# Patient Record
Sex: MALE | Race: WHITE | NOT HISPANIC OR LATINO | Employment: FULL TIME | URBAN - METROPOLITAN AREA
[De-identification: names, ages, dates, MRNs, and addresses within clinical notes are randomized per-mention and may not be internally consistent; named-entity substitution may affect disease eponyms.]

---
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
## 2018-11-19 ENCOUNTER — OFFICE VISIT - HEALTHEAST (OUTPATIENT)
Dept: CARDIOLOGY | Facility: CLINIC | Age: 18
End: 2018-11-19

## 2018-11-19 DIAGNOSIS — R00.2 PALPITATIONS: ICD-10-CM

## 2018-11-19 ASSESSMENT — MIFFLIN-ST. JEOR: SCORE: 1765.45

## 2018-11-21 LAB
ATRIAL RATE - MUSE: 66 BPM
DIASTOLIC BLOOD PRESSURE - MUSE: NORMAL MMHG
INTERPRETATION ECG - MUSE: NORMAL
P AXIS - MUSE: 38 DEGREES
PR INTERVAL - MUSE: 114 MS
QRS DURATION - MUSE: 94 MS
QT - MUSE: 368 MS
QTC - MUSE: 385 MS
R AXIS - MUSE: 54 DEGREES
SYSTOLIC BLOOD PRESSURE - MUSE: NORMAL MMHG
T AXIS - MUSE: 67 DEGREES
VENTRICULAR RATE- MUSE: 66 BPM

## 2019-01-10 ENCOUNTER — HOSPITAL ENCOUNTER (OUTPATIENT)
Dept: CARDIOLOGY | Facility: HOSPITAL | Age: 19
Discharge: HOME | End: 2019-01-10
Attending: INTERNAL MEDICINE

## 2019-01-10 ENCOUNTER — HOSPITAL ENCOUNTER (OUTPATIENT)
Dept: CARDIOLOGY | Facility: CLINIC | Age: 19
Discharge: HOME | End: 2019-01-10
Attending: INTERNAL MEDICINE

## 2019-01-10 DIAGNOSIS — R00.2 PALPITATIONS: ICD-10-CM

## 2019-01-10 ASSESSMENT — MIFFLIN-ST. JEOR: SCORE: 1761.49

## 2019-01-11 LAB
AORTIC ROOT: 2.7 CM
BSA FOR ECHO PROCEDURE: 1.92 M2
CV BLOOD PRESSURE: NORMAL MMHG
CV ECHO HEIGHT: 71 IN
CV ECHO WEIGHT: 163 LBS
DOP CALC LVOT AREA: 3.14 CM2
DOP CALC LVOT DIAMETER: 2 CM
DOP CALC LVOT PEAK VEL: 80.5 CM/S
DOP CALC LVOT STROKE VOLUME: 49.3 CM3
DOP CALCLVOT PEAK VEL VTI: 15.7 CM
EJECTION FRACTION: 72 % (ref 55–75)
FRACTIONAL SHORTENING: 35 % (ref 28–44)
INTERVENTRICULAR SEPTUM IN END DIASTOLE: 0.91 CM (ref 0.6–1)
IVS/PW RATIO: 1.2
LA AREA 1: 10.3 CM2
LA AREA 2: 16.5 CM2
LEFT ATRIUM LENGTH: 4.28 CM
LEFT ATRIUM SIZE: 2.5 CM
LEFT ATRIUM TO AORTIC ROOT RATIO: 0.93 NO UNITS
LEFT ATRIUM VOLUME INDEX: 17.6 ML/M2
LEFT ATRIUM VOLUME: 33.8 ML
LEFT VENTRICLE CARDIAC INDEX: 1.7 L/MIN/M2
LEFT VENTRICLE CARDIAC OUTPUT: 3.3 L/MIN
LEFT VENTRICLE DIASTOLIC VOLUME INDEX: 62 CM3/M2 (ref 34–74)
LEFT VENTRICLE DIASTOLIC VOLUME: 119 CM3 (ref 62–150)
LEFT VENTRICLE HEART RATE: 67 BPM
LEFT VENTRICLE MASS INDEX: 69.7 G/M2
LEFT VENTRICLE SYSTOLIC VOLUME INDEX: 17.2 CM3/M2 (ref 11–31)
LEFT VENTRICLE SYSTOLIC VOLUME: 33 CM3 (ref 21–61)
LEFT VENTRICULAR INTERNAL DIMENSION IN DIASTOLE: 4.77 CM (ref 4.2–5.8)
LEFT VENTRICULAR INTERNAL DIMENSION IN SYSTOLE: 3.1 CM (ref 2.5–4)
LEFT VENTRICULAR MASS: 133.8 G
LEFT VENTRICULAR OUTFLOW TRACT MEAN GRADIENT: 2 MMHG
LEFT VENTRICULAR OUTFLOW TRACT MEAN VELOCITY: 56.7 CM/S
LEFT VENTRICULAR OUTFLOW TRACT PEAK GRADIENT: 3 MMHG
LEFT VENTRICULAR POSTERIOR WALL IN END DIASTOLE: 0.77 CM (ref 0.6–1)
LV STROKE VOLUME INDEX: 25.7 ML/M2
MITRAL VALVE E/A RATIO: 2.8
MV AVERAGE E/E' RATIO: 5.6 CM/S
MV DECELERATION TIME: 183 MS
MV E'TISSUE VEL-LAT: 20.1 CM/S
MV E'TISSUE VEL-MED: 13.5 CM/S
MV LATERAL E/E' RATIO: 4.7
MV MEDIAL E/E' RATIO: 6.9
MV PEAK A VELOCITY: 34.1 CM/S
MV PEAK E VELOCITY: 93.8 CM/S
NUC REST DIASTOLIC VOLUME INDEX: 2608 LBS
NUC REST SYSTOLIC VOLUME INDEX: 71 IN
TRICUSPID VALVE ANULAR PLANE SYSTOLIC EXCURSION: 2.3 CM

## 2019-08-09 ENCOUNTER — OFFICE VISIT - HEALTHEAST (OUTPATIENT)
Dept: FAMILY MEDICINE | Facility: CLINIC | Age: 19
End: 2019-08-09

## 2019-08-09 ENCOUNTER — COMMUNICATION - HEALTHEAST (OUTPATIENT)
Dept: TELEHEALTH | Facility: CLINIC | Age: 19
End: 2019-08-09

## 2019-08-09 DIAGNOSIS — F41.9 ANXIETY: ICD-10-CM

## 2019-08-09 ASSESSMENT — MIFFLIN-ST. JEOR: SCORE: 1771.12

## 2019-09-13 ENCOUNTER — OFFICE VISIT - HEALTHEAST (OUTPATIENT)
Dept: FAMILY MEDICINE | Facility: CLINIC | Age: 19
End: 2019-09-13

## 2019-09-13 DIAGNOSIS — Z23 NEED FOR VACCINATION: ICD-10-CM

## 2019-09-13 DIAGNOSIS — F41.9 ANXIETY: ICD-10-CM

## 2019-09-13 ASSESSMENT — ANXIETY QUESTIONNAIRES
6. BECOMING EASILY ANNOYED OR IRRITABLE: SEVERAL DAYS
IF YOU CHECKED OFF ANY PROBLEMS ON THIS QUESTIONNAIRE, HOW DIFFICULT HAVE THESE PROBLEMS MADE IT FOR YOU TO DO YOUR WORK, TAKE CARE OF THINGS AT HOME, OR GET ALONG WITH OTHER PEOPLE: SOMEWHAT DIFFICULT
7. FEELING AFRAID AS IF SOMETHING AWFUL MIGHT HAPPEN: SEVERAL DAYS
GAD7 TOTAL SCORE: 5
1. FEELING NERVOUS, ANXIOUS, OR ON EDGE: SEVERAL DAYS
2. NOT BEING ABLE TO STOP OR CONTROL WORRYING: NOT AT ALL
5. BEING SO RESTLESS THAT IT IS HARD TO SIT STILL: NOT AT ALL
3. WORRYING TOO MUCH ABOUT DIFFERENT THINGS: SEVERAL DAYS
4. TROUBLE RELAXING: SEVERAL DAYS

## 2019-09-13 ASSESSMENT — MIFFLIN-ST. JEOR: SCORE: 1766.7

## 2019-09-13 ASSESSMENT — PATIENT HEALTH QUESTIONNAIRE - PHQ9: SUM OF ALL RESPONSES TO PHQ QUESTIONS 1-9: 2

## 2019-12-31 ENCOUNTER — COMMUNICATION - HEALTHEAST (OUTPATIENT)
Dept: FAMILY MEDICINE | Facility: CLINIC | Age: 19
End: 2019-12-31

## 2020-01-07 ENCOUNTER — OFFICE VISIT - HEALTHEAST (OUTPATIENT)
Dept: FAMILY MEDICINE | Facility: CLINIC | Age: 20
End: 2020-01-07

## 2020-01-07 DIAGNOSIS — F40.243 ANXIETY WITH FLYING: ICD-10-CM

## 2020-01-07 DIAGNOSIS — F41.9 ANXIETY: ICD-10-CM

## 2020-01-07 ASSESSMENT — ANXIETY QUESTIONNAIRES
IF YOU CHECKED OFF ANY PROBLEMS ON THIS QUESTIONNAIRE, HOW DIFFICULT HAVE THESE PROBLEMS MADE IT FOR YOU TO DO YOUR WORK, TAKE CARE OF THINGS AT HOME, OR GET ALONG WITH OTHER PEOPLE: NOT DIFFICULT AT ALL
3. WORRYING TOO MUCH ABOUT DIFFERENT THINGS: SEVERAL DAYS
7. FEELING AFRAID AS IF SOMETHING AWFUL MIGHT HAPPEN: SEVERAL DAYS
2. NOT BEING ABLE TO STOP OR CONTROL WORRYING: NOT AT ALL
1. FEELING NERVOUS, ANXIOUS, OR ON EDGE: SEVERAL DAYS
6. BECOMING EASILY ANNOYED OR IRRITABLE: NOT AT ALL
4. TROUBLE RELAXING: SEVERAL DAYS
GAD7 TOTAL SCORE: 4
5. BEING SO RESTLESS THAT IT IS HARD TO SIT STILL: NOT AT ALL

## 2020-01-07 ASSESSMENT — MIFFLIN-ST. JEOR: SCORE: 1833.83

## 2020-01-07 ASSESSMENT — PATIENT HEALTH QUESTIONNAIRE - PHQ9: SUM OF ALL RESPONSES TO PHQ QUESTIONS 1-9: 3

## 2020-02-18 ENCOUNTER — COMMUNICATION - HEALTHEAST (OUTPATIENT)
Dept: SCHEDULING | Facility: CLINIC | Age: 20
End: 2020-02-18

## 2020-04-20 ENCOUNTER — OFFICE VISIT - HEALTHEAST (OUTPATIENT)
Dept: FAMILY MEDICINE | Facility: CLINIC | Age: 20
End: 2020-04-20

## 2020-04-20 DIAGNOSIS — F41.9 ANXIETY: ICD-10-CM

## 2020-04-20 ASSESSMENT — ANXIETY QUESTIONNAIRES
7. FEELING AFRAID AS IF SOMETHING AWFUL MIGHT HAPPEN: NOT AT ALL
IF YOU CHECKED OFF ANY PROBLEMS ON THIS QUESTIONNAIRE, HOW DIFFICULT HAVE THESE PROBLEMS MADE IT FOR YOU TO DO YOUR WORK, TAKE CARE OF THINGS AT HOME, OR GET ALONG WITH OTHER PEOPLE: NOT DIFFICULT AT ALL
5. BEING SO RESTLESS THAT IT IS HARD TO SIT STILL: NOT AT ALL
3. WORRYING TOO MUCH ABOUT DIFFERENT THINGS: SEVERAL DAYS
GAD7 TOTAL SCORE: 3
2. NOT BEING ABLE TO STOP OR CONTROL WORRYING: NOT AT ALL
6. BECOMING EASILY ANNOYED OR IRRITABLE: NOT AT ALL
4. TROUBLE RELAXING: SEVERAL DAYS
1. FEELING NERVOUS, ANXIOUS, OR ON EDGE: SEVERAL DAYS

## 2020-04-20 ASSESSMENT — PATIENT HEALTH QUESTIONNAIRE - PHQ9: SUM OF ALL RESPONSES TO PHQ QUESTIONS 1-9: 1

## 2020-07-16 ENCOUNTER — COMMUNICATION - HEALTHEAST (OUTPATIENT)
Dept: FAMILY MEDICINE | Facility: CLINIC | Age: 20
End: 2020-07-16

## 2020-07-16 DIAGNOSIS — F41.9 ANXIETY: ICD-10-CM

## 2020-07-22 ENCOUNTER — COMMUNICATION - HEALTHEAST (OUTPATIENT)
Dept: BEHAVIORAL HEALTH | Facility: CLINIC | Age: 20
End: 2020-07-22

## 2021-01-11 ENCOUNTER — OFFICE VISIT - HEALTHEAST (OUTPATIENT)
Dept: FAMILY MEDICINE | Facility: CLINIC | Age: 21
End: 2021-01-11

## 2021-01-11 DIAGNOSIS — J45.20 MILD INTERMITTENT REACTIVE AIRWAY DISEASE WITHOUT COMPLICATION: ICD-10-CM

## 2021-01-11 RX ORDER — ALBUTEROL SULFATE 90 UG/1
2 AEROSOL, METERED RESPIRATORY (INHALATION) EVERY 6 HOURS PRN
Qty: 1 EACH | Refills: 0 | Status: SHIPPED | OUTPATIENT
Start: 2021-01-11

## 2021-01-11 RX ORDER — ESCITALOPRAM OXALATE 10 MG/1
TABLET ORAL
Status: SHIPPED | COMMUNITY
Start: 2020-11-03

## 2021-01-11 RX ORDER — BUPROPION HYDROCHLORIDE 150 MG/1
TABLET ORAL
Status: SHIPPED | COMMUNITY
Start: 2020-11-03 | End: 2022-12-20 | Stop reason: ALTCHOICE

## 2021-02-15 ENCOUNTER — OFFICE VISIT - HEALTHEAST (OUTPATIENT)
Dept: FAMILY MEDICINE | Facility: CLINIC | Age: 21
End: 2021-02-15

## 2021-02-15 DIAGNOSIS — J45.20 MILD INTERMITTENT REACTIVE AIRWAY DISEASE WITHOUT COMPLICATION: ICD-10-CM

## 2021-02-15 DIAGNOSIS — F41.9 ANXIETY: ICD-10-CM

## 2021-05-26 ASSESSMENT — PATIENT HEALTH QUESTIONNAIRE - PHQ9: SUM OF ALL RESPONSES TO PHQ QUESTIONS 1-9: 2

## 2021-05-27 ASSESSMENT — PATIENT HEALTH QUESTIONNAIRE - PHQ9
SUM OF ALL RESPONSES TO PHQ QUESTIONS 1-9: 1
SUM OF ALL RESPONSES TO PHQ QUESTIONS 1-9: 3

## 2021-05-28 ASSESSMENT — ANXIETY QUESTIONNAIRES
GAD7 TOTAL SCORE: 4
GAD7 TOTAL SCORE: 5
GAD7 TOTAL SCORE: 3

## 2021-05-28 ASSESSMENT — ASTHMA QUESTIONNAIRES: ACT_TOTALSCORE: 22

## 2021-05-30 VITALS — WEIGHT: 149 LBS | BODY MASS INDEX: 20.86 KG/M2 | HEIGHT: 71 IN

## 2021-05-31 VITALS — WEIGHT: 159.5 LBS | HEIGHT: 71 IN | BODY MASS INDEX: 22.33 KG/M2

## 2021-05-31 NOTE — PROGRESS NOTES
"Assessment / Impression     1. Anxiety  sertraline (ZOLOFT) 50 MG tablet         Plan:     Discussed with patient various treatment options, including medications as well as therapy.  Patient would like to start medication, and will consider meeting with counselor when he returns to school.  We will begin sertraline 25 mg daily for 1 week, then increase to 50mg daily.  Discussed possible side effects of medication such as GI side effects, decreased libido, possible weight gain.   If patient notes any severe side effects such as severe agitation or suicidal ideation, he may call the clinic or 911 immediately for further instructions. Discussed with patient that it may take 4-6 weeks for full effect.  We may also need to increase dose.  Patient to make follow-up appointment with me in 4-6 weeks and says he can return home for an appointment if needed.      Return in about 1 month (around 9/9/2019) for Follow Up, Med Check.    Subjective:      HPI: Jalen Hurtado is a 18 y.o. male, new to [facility], who presents for anxiety concerns.  Feels he has had anxiety \"for a lot of my life\".  He previously had had meds for concerns of flying with Ativan.  He goes to school at [employer], and anxiety has been worse due to pressure with school.  He is studying management information systems.  He has difficulty concentrating when he knows there's a test coming up, and feels difficulty concentrating is due to the anxiety.  He made [employer] first semester, though didn't do as well, thinking it's due to anxiety.  No one else has necessarily noted anxiety. Planning to go back to school August 23.  No suicidal or homicidal ideation.  His brother and sister do also have anxiety.      Medical History:     Patient Active Problem List   Diagnosis     Vaccination Not Carried Out Due To Caregiver Refusal     Visual Impairment     Wears glasses       History reviewed. No pertinent past medical history.    History reviewed. No " "pertinent surgical history.    Current Medications:     Current Outpatient Medications   Medication Sig     sertraline (ZOLOFT) 50 MG tablet Take 1/2 tab daily for 1 week, then 1 tab daily.       Family History:     Family History   Problem Relation Age of Onset     No Medical Problems Mother      No Medical Problems Father      Anxiety disorder Sister      Anxiety disorder Brother      Neuropathy Maternal Grandfather      Other Paternal Grandmother         Colon issues     Lung cancer Paternal Grandfather         was a smoker       Review of Systems  All other systems reviewed and are negative.         Social History:     Social History     Tobacco Use   Smoking Status Never Smoker   Smokeless Tobacco Current User     Types: Snuff     Social History     Social History Narrative     Not on file         Objective:     /66 (Patient Site: Right Arm, Patient Position: Sitting, Cuff Size: Adult Regular)   Pulse 60   Temp 98  F (36.7  C) (Oral)   Resp 16   Ht 5' 11.5\" (1.816 m)   Wt 163 lb 6 oz (74.1 kg)   BMI 22.47 kg/m    Physical Examination: General appearance - alert, well appearing, and in no distress  Eyes: pupils equal and reactive, extraocular eye movements intact  Mouth: mucous membranes moist, pharynx normal without lesions  Lungs: normal respiratory effort  Neurological: alert, oriented, normal speech, no focal findings or movement disorder noted.    Extremities: No edema, no clubbing or cyanosis  Psychiatric: Normal affect. Does not appear anxious or depressed.  Normal speech pattern.  Maintains eye contact.  Well-groomed.  PHQ-9 = 3.  KAREN-7 = 8.  See flow sheet for details.    No results found for this or any previous visit (from the past 168 hour(s)).      Mary Alberts MD  8/9/2019  10:48 AM        "

## 2021-06-01 NOTE — PROGRESS NOTES
Assessment / Impression     1. Anxiety  sertraline (ZOLOFT) 50 MG tablet   2. Need for vaccination  Influenza,Seasonal,Quad,INJ =/>6months    CANCELED: HPV vaccine quadrivalent 3 dose IM         Plan:     Overall, patient has been doing well on current dose of sertraline 50 mg daily.  We did briefly discussed the patient would like to increase, though he feels symptoms are manageable at this time, therefore we will keep at sertraline 50 mg daily.  If he does feel he needs an increase in dose, he may let me know or come in for office visit.  Plan to check in at least once every 6 to 12 months, sooner if needed.        Return in about 6 months (around 3/13/2020) for Med Check, Annual physical.    Subjective:      HPI: Jalen Hurtado is a 18 y.o. male who presents for anxiety follow-up.  Please see my previous note for more details.  Briefly, at last visit with me, we had started patient on sertraline, currently taking 50 mg daily.  He has since returned to college at [employer], and does note an improvement.  Overall has noted more energy, and and has less over thinking in various circumstances.  He did initially note some mild decreased appetite when he started medication, though that has gone.  He still has some mild anxiety, though not sure if it would ever fully go away and feels it is manageable at this time.  No suicidal homicidal ideation.      Medical History:     Patient Active Problem List   Diagnosis     Vaccination Not Carried Out Due To Caregiver Refusal     Visual Impairment     Wears glasses       History reviewed. No pertinent past medical history.    History reviewed. No pertinent surgical history.    Current Medications:     Current Outpatient Medications   Medication Sig     sertraline (ZOLOFT) 50 MG tablet Take 1 tablet (50 mg total) by mouth daily.       Family History:     Family History   Problem Relation Age of Onset     No Medical Problems Mother      No Medical Problems Father      Anxiety  "disorder Sister      Anxiety disorder Brother      Neuropathy Maternal Grandfather      Other Paternal Grandmother         Colon issues     Lung cancer Paternal Grandfather         was a smoker       Review of Systems  All other systems reviewed and are negative.         Social History:     Social History     Tobacco Use   Smoking Status Never Smoker   Smokeless Tobacco Current User     Types: Snuff     Social History     Social History Narrative     Not on file         Objective:     /68 (Patient Site: Left Arm, Patient Position: Sitting, Cuff Size: Adult Regular)   Pulse 80   Temp 98.3  F (36.8  C) (Oral)   Resp 18   Ht 5' 11.5\" (1.816 m)   Wt 162 lb 6.4 oz (73.7 kg)   BMI 22.33 kg/m    Physical Examination: General appearance - alert, well appearing, and in no distress  Eyes: pupils equal and reactive, extraocular eye movements intact  Neurological: alert, oriented, normal speech, no focal findings or movement disorder noted.    Extremities: No edema, no clubbing or cyanosis  Psychiatric: Normal affect. Does not appear anxious or depressed.  Normal speech pattern.  Maintains eye contact.  Well-groomed.  No active suicidal homicidal ideation.  PHQ-9  2.  KAREN-70+5.  See flow sheet for details.    No results found for this or any previous visit (from the past 168 hour(s)).      Mary Alberts MD  9/13/2019  4:17 PM        "

## 2021-06-02 VITALS — HEIGHT: 71 IN | BODY MASS INDEX: 22.82 KG/M2 | WEIGHT: 163 LBS

## 2021-06-02 VITALS — WEIGHT: 163 LBS | BODY MASS INDEX: 22.82 KG/M2 | HEIGHT: 71 IN

## 2021-06-03 VITALS
DIASTOLIC BLOOD PRESSURE: 62 MMHG | WEIGHT: 177.2 LBS | HEART RATE: 62 BPM | RESPIRATION RATE: 16 BRPM | BODY MASS INDEX: 24 KG/M2 | HEIGHT: 72 IN | SYSTOLIC BLOOD PRESSURE: 108 MMHG

## 2021-06-03 VITALS
BODY MASS INDEX: 22 KG/M2 | HEIGHT: 72 IN | WEIGHT: 162.4 LBS | DIASTOLIC BLOOD PRESSURE: 68 MMHG | RESPIRATION RATE: 18 BRPM | HEART RATE: 80 BPM | SYSTOLIC BLOOD PRESSURE: 104 MMHG | TEMPERATURE: 98.3 F

## 2021-06-03 VITALS — HEIGHT: 72 IN | BODY MASS INDEX: 22.13 KG/M2 | WEIGHT: 163.38 LBS

## 2021-06-04 NOTE — TELEPHONE ENCOUNTER
Pt needs appt with me before rx can be given.  Ok to use reserve slot. Please assist patient in scheduling appointment.

## 2021-06-05 NOTE — PROGRESS NOTES
Assessment / Impression     1. Anxiety  sertraline (ZOLOFT) 50 MG tablet   2. Anxiety with flying  LORazepam (ATIVAN) 1 MG tablet         Plan:     Patient feels the symptoms have improved, though does feel that there is room for improvement.  I did offer to increase the dosing of his sertraline or switch to another medication, though patient was somewhat hesitant also because he will be abroad and unable to follow-up with me for a few months.  Therefore, I do agree it is reasonable for him to continue sertraline 50 mg daily at this time.  I did give him a one-time vacation override so he will have enough medication during his 4 months abroad.  He was also given a small amount of Ativan 1 mg #10 tabs with no refills which he may take prior to flying.  Advised patient that he should not take medication with alcohol use.  Patient understands, agrees with plan.  He will follow-up with me when he returns from [place].    Return in about 4 months (around 5/11/2020) for Med Check.    Subjective:      HPI: Jalen Hurtado is a 19 y.o. male who presents for anxiety follow-up.  Please see my previous notes for more details.  He has been on sertraline 50 mg daily, which he states has sore sort of helped with his anxiety, though he feels there could be room for improvement.  He also has noted decrease libido and other sexual side effects from medication, though he feels this is tolerable.  He will be leaving this Saturday to study abroad in [place] for 4 months.  He does not want to adjust medications at this time since he will be going out of the country for an extended period of time.    Patient also has anxiety surrounding flying, has previously used Ativan in the past to help with fear of flying, wondering if he can get a small amount.  He will also be traveling periodically in [place] during his time abroad.      Medical History:     Patient Active Problem List   Diagnosis     Vaccination Not Carried Out Due To Caregiver  "Refusal     Visual Impairment     Wears glasses       No past medical history on file.    No past surgical history on file.    Current Medications:     Current Outpatient Medications   Medication Sig     sertraline (ZOLOFT) 50 MG tablet Take 1 tablet (50 mg total) by mouth daily.     LORazepam (ATIVAN) 1 MG tablet Take 1/2 tab to 1 tab as needed one hour prior to air travel for anxiety.       Family History:     Family History   Problem Relation Age of Onset     No Medical Problems Mother      No Medical Problems Father      Anxiety disorder Sister      Anxiety disorder Brother      Neuropathy Maternal Grandfather      Other Paternal Grandmother         Colon issues     Lung cancer Paternal Grandfather         was a smoker       Review of Systems  All other systems reviewed and are negative.         Social History:     Social History     Tobacco Use   Smoking Status Never Smoker   Smokeless Tobacco Current User     Types: Snuff     Social History     Social History Narrative     Not on file         Objective:     /62 (Patient Site: Right Arm, Patient Position: Sitting, Cuff Size: Adult Regular)   Pulse 62   Resp 16   Ht 5' 11.5\" (1.816 m)   Wt 177 lb 3.2 oz (80.4 kg)   BMI 24.37 kg/m    Physical Examination: General appearance - alert, well appearing, and in no distress  Eyes: extraocular eye movements intact  Neurological: alert, oriented, normal speech, no focal findings or movement disorder noted.    Extremities: No edema, no clubbing or cyanosis  Psychiatric: Normal affect. Does not appear anxious or depressed.  Normal speech pattern.  Maintains eye contact.  PHQ-9 = 3.  KAREN-7 = 4.  See flow sheet for details.  No active suicidal or homicidal ideation.    No results found for this or any previous visit (from the past 168 hour(s)).      Mary Alberts MD  1/7/2020  12:32 PM         "

## 2021-06-06 NOTE — TELEPHONE ENCOUNTER
Mom jer informed to tell or help pt do an evisit I did give her our [facility] patient support number incase they arnt able to sign in or figure out how to submit the evisit

## 2021-06-06 NOTE — TELEPHONE ENCOUNTER
Son is studying in [place] until May. He is taking medication, seen by Dr Alberts just before he left. He would like trying to wean off of    RX: Sertraline 50mg tablet daily. He needs instructions how to do this.  He is using an international SIM card while he is in [place], but needs to insert it when he is using it.  He wants to know if he needs to set up an appointment to talk to Dr Alberts on the phone, or do an e-visit?     Please call mother with this information.     Zhanna Garcia RN Triage Nurse Advisor        Reason for Disposition    Caller has NON-URGENT medication question about med that PCP prescribed and triager unable to answer question    Protocols used: MEDICATION QUESTION CALL-A-

## 2021-06-07 NOTE — PROGRESS NOTES
"Jalen Hurtado is a 19 y.o. male who is being evaluated via a billable telephone visit.      The patient has been notified of following:     \"This telephone visit will be conducted via a call between you and your physician/provider. We have found that certain health care needs can be provided without the need for a physical exam.  This service lets us provide the care you need with a short phone conversation.  If a prescription is necessary we can send it directly to your pharmacy.  If lab work is needed we can place an order for that and you can then stop by our lab to have the test done at a later time.    Telephone visits are billed at different rates depending on your insurance coverage. During this emergency period, for some insurers they may be billed the same as an in-person visit.  Please reach out to your insurance provider with any questions.    If during the course of the call the physician/provider feels a telephone visit is not appropriate, you will not be charged for this service.\"    Patient has given verbal consent to a Telephone visit? Yes    Patient would like to receive their AVS by AVS Preference: Anayeli.    Additional provider notes:  19 year old male, here today for phone visit to discuss anxiety medication.  Was studying abroad in [place] this spring, however needed to return home early March due to COVID19.  He is otherwise healthy. Patient has been on sertraline for a few months, which has helped from an anxiety standpoint, however, he has noted side effects, especially sweats and sexual side effects including decreased libido.  He has not used any of his Ativan aside from flying.  Feels that his anxiety is manageable at this time and would prefer not to switch medication at this time.    See flowsheet for PHQ-9 and KAREN-7.    Assessment/Plan:  1. Anxiety  Managed at this time, though may be due to medications.  Offered option to switch to another medication, though he declined at this time.  " Will decrease sertraline to 25mg daily for 1 week then stop.  Did ask patient to check in with me in ~1 month to see how his symptoms are and consider starting different medication at that time.  Patient understands, agrees with plan.          Phone call duration:  Start time: 9:54am  Stop time: 10:00am    Mary Alberts MD

## 2021-06-09 NOTE — PROGRESS NOTES
"  ASSESSMENT & PLAN:  1. Situational anxiety - air travel      2. Wears glasses          Patient Instructions   Take half a tablet orally an hour before you leave.  I expect you will not have any problems    Safe travels!    Call if you have any questions or concerns.                 CHIEF COMPLAINT:  Chief Complaint   Patient presents with     Other     flying anxiety, flying over seas, wants to talk about an anti anxiety med       HISTORY OF PRESENT ILLNESS:  Jalen is a 16 y.o. male presenting to the clinic today to discuss flying anxiety. He is traveling to [place], [place], and [place] on 3/5/17 to visit his brother who is studying abroad.  He has never been on a plane and is nervous about flying over the ocean. He does not have motion sickness in cars. He has never been on any anti-anxiety medications but he has some anxiety. He tends to get anxious about other things like school and work. His brother took anxiety medications on the way to [place].     Health Maintenance: He is due for HPV vaccine. He made an eye appointment after his visit on 11/30/16 and now has glasses. He wears the glasses at school and while driving.     REVIEW OF SYSTEMS:   No recent illness.  All other systems are negative.    PFSH:  He is going to college next year, either [employer] or [employer].     TOBACCO USE:  History   Smoking Status     Never Smoker   Smokeless Tobacco     Not on file       VITALS:  Vitals:    03/03/17 1512   BP: 100/60   Weight: 149 lb (67.6 kg)   Height: 5' 11\" (1.803 m)     Wt Readings from Last 3 Encounters:   03/03/17 149 lb (67.6 kg) (49 %, Z= -0.03)*   11/30/16 144 lb 6.4 oz (65.5 kg) (43 %, Z= -0.18)*   08/03/16 147 lb 0.8 oz (66.7 kg) (50 %, Z= 0.00)*     * Growth percentiles are based on CDC 2-20 Years data.     Body mass index is 20.78 kg/(m^2).    PHYSICAL EXAM:  Nursing note and vitals reviewed.  Constitutional:Patient appears alert, no acute distress. Well-appearing.      ADDITIONAL HISTORY " SUMMARIZED (2): None.  DECISION TO OBTAIN EXTRA INFORMATION (1): None.   RADIOLOGY TESTS (1): None.  LABS (1): None.  MEDICINE TESTS (1): None.  INDEPENDENT REVIEW (2 each): None.       The visit lasted a total of 21 minutes face to face with the patient. Over 50% of the time was spent counseling and educating the patient about travel anxiety.    InAna, am scribing for and in the presence of, Dr. Freitas.    I, Dr. Freitas personally performed the services described in this documentation, as scribed by Anna Cadena in my presence, and it is both accurate and complete.    MEDICATIONS:  Current Outpatient Prescriptions   Medication Sig Dispense Refill     LORazepam (ATIVAN) 1 MG tablet Take 1/2 to 1 tablet by mouth one hour before air travel as needed for anxiety. 5 tablet 0     No current facility-administered medications for this visit.        Total data points: None.     Lorena Freitas MD

## 2021-06-14 NOTE — PROGRESS NOTES
Jalen Hurtado is a 20 y.o. male who is being evaluated via a billable video visit.      How would you like to obtain your AVS? [employer].  If dropped from the video visit, the video invitation should be resent by: Text to cell phone: [phone]  Will anyone else be joining your video visit? No    Video Start Time: 2:41pm  Assessment & Plan     Mild intermittent reactive airway disease without complication  Discussed with patient that normally I would like to see him in clinic first, however due to COVID-19 pandemic, and given patient's history, could presume that patient likely has a component of reactive airway disease with triggers including cold air and exercise.  I instructed patient on albuterol use, possible adverse effects of medication.  I would like patient to touch base with me, can send me a note via [employer] and let me know whether albuterol inhaler is helping.  - albuterol (PROAIR HFA;PROVENTIL HFA;VENTOLIN HFA) 90 mcg/actuation inhaler  Dispense: 1 each; Refill: 0                       Tobacco Cessation:   reports that he has never smoked. His smokeless tobacco use includes snuff.      Return in about 1 month (around 2/11/2021) for Follow Up--let Dr. Alberts know how you're doing.    Mary Alberts MD  [facility]    Subjective     Jalen Hurtado is 20 y.o. and presents to clinic today for the following health issues   HPI   20-year-old female, here today for video visit with concerns about possible asthma-like symptoms.  Patient tells me over the last few years when he is active during the winter months outdoors, he will develop some cough and chest tightness.  When he goes inside, symptoms resolved.  This winter it seems to be more severe, and that it will last longer and occur more frequently.  He notices that the cough will last longer and have some wheezing.  He denies any fevers.  Again, when he is indoors for extended period of time he denies any symptoms such as  cough.          Review of Systems  See above      Objective    Vitals - Patient Reported  Weight (Patient Reported): 180 lb (81.6 kg)    Physical Exam  GENERAL: Healthy, alert and no distress  EYES: Eyes grossly normal to inspection. No discharge or erythema, or obvious scleral/conjunctival abnormalities.  RESP: No audible wheeze, cough, or visible cyanosis.  No visible retractions or increased work of breathing.    NEURO: Cranial nerves grossly intact. Mentation and speech appropriate for age.  PSYCH: Mentation appears normal, affect normal/bright, judgement and insight intact, normal speech and appearance well-groomed           Video-Visit Details    Type of service:  Video Visit    Video End Time (time video stopped): 2:48pm  Originating Location (pt. Location): Home    Distant Location (provider location):  [facility]     Platform used for Video Visit: [employer]

## 2021-06-14 NOTE — PROGRESS NOTES
" [facility] Well Child Check    ASSESSMENT & PLAN  Jalen Hurtado is a 17 y.o. who has normal growth and normal development.    Diagnoses and all orders for this visit:    Encounter for routine adult health examination with abnormal findings  -     Influenza, Seasonal Quad, Preservative Free 36+ Months (syringe)  -     Lipid Cascade FASTING  -     Hearing Screening      Return to clinic in 1 year for a Well Child Check or sooner as needed    IMMUNIZATIONS/LABS  Immunizations were reviewed and orders were placed as appropriate. and I have discussed the risks and benefits of all of the vaccine components with the patient/parents.  All questions have been answered.    REFERRALS  Dental:  Recommend routine dental care as appropriate., The patient has already established care with a dentist.  Other:  No additional referrals were made at this time.    ANTICIPATORY GUIDANCE  I have reviewed age appropriate anticipatory guidance.  Social:  Friends, Peer Pressure and Extracurricular Activities  Parenting:  Support, [place]/Dependence and Confidential Health Care  Nutrition:  Junk Food and Body Image  Play and Communication:  Organized Sports, Appropriate Use of TV and Read Books  Health:  Drugs, Smoking, Alcohol, Activity (>45 min/day), Sleep, Sun Screen and Dental Care  Safety:  Seat Belts, Swimming Safety, Bike/Motorcycle Helmets and Outdoor Safety Avoiding Sun Exposure  Sexuality:  Safe Sex and STD's    HEALTH HISTORY  Do you have any concerns that you'd like to discuss today?: heart beats face at times (maybe anxiety)   For the last year or 2, he has had episodes 1-2x a month. His heart rate speeds up for 10 seconds at a time. He thinks it is due to anxiety or exertion. He is unsure if it is related to his caffeine intake. His mom notes that he had similar complaints during high school. He denies that it is painful but says it is \"uncomfortable\" at times. His energy level has been good. For exercise, he plays hockey " or goes to the gym. He drinks a cup of coffee a couple of times a week. He does not drink as much soda as before. He is stressed out by school. He did well in his first semester at college, mostly B's, but he thinks he could have done better.  He has never seen a counselor for his worrying. His mom has not noticed that his stress levels have changed. His sleep has been good. He gets about 9 hours a day, 7 hours when he is at school. He says getting out of bed is okay.    [employer]  He goes to [employer], first year.    He went to [place] [facility], [place], and [place] in March 2016.  For anxiety related to overseas flight he used a small dose of ativan which worked well.   He has not taken any other anti anxiety medication.     He is moving in with his frat next semester.    Last year, his mom had a heart monitor for heat palpitations. An echocardiogram was normal. The doctor suggested diet changes.     There is a history of anxiety in his siblings and both sides of the family.  Roomed by: Jess     Accompanied by Mother        Do you have any significant health concerns in your family history?: No  Family History   Problem Relation Age of Onset     Neuropathy Maternal Grandfather      Other Paternal Grandmother      Colon issues     Lung cancer Paternal Grandfather      was a smoker     Since your last visit, have there been any major changes in your family, such as a move, job change, separation, divorce, or death in the family?: No  Has a lack of transportation kept you from medical appointments?: No    Home  Who lives in your home?:  Mom, dad, 1 younger sister   Social History     Social History Narrative     Do you have any concerns about losing your housing?: No  Is your housing safe and comfortable?: Yes  Do you have any trouble with sleep?:  No    Education  What school do you child attend?:  [employer]   What grade are you in?:  Freshman   How do you perform in school (grades, behavior, attention, homework?: doing  "good      Eating  Do you eat regular meals including fruits and vegetables?:  yes  What are you drinking (cow's milk, water, soda, juice, sports drinks, energy drinks, etc)?: cow's milk- 2%, water, soda, juice and sports drinks  Have you been worried that you don't have enough food?: No  Do you have concerns about your body or appearance?:  No    Activities  Do you have friends?:  yes  Do you get at least one hour of physical activity per day?:  yes  How many hours a day are you in front of a screen other than for schoolwork (computer, TV, phone)?:  2  What do you do for exercise?:  Play hockey, go to the gym   Do you have interest/participate in community activities/volunteers/school sports?:  no    MENTAL HEALTH SCREENING  No Data Recorded  No Data Recorded    VISION/HEARING  Vision: pt wears glasses and is seen by and eye doctor   Hearing:  Completed. See Results    No exam data present    TB Risk Assessment:  The patient and/or parent/guardian answer positive to:  self or family member has traveled outside of the [place] in the past 12 months [facility]    Dyslipidemia Risk Screening  Have either of your parents or any of your grandparents had a stroke or heart attack before age 55?: No  Any parents with high cholesterol or currently taking medications to treat?: No     Dental  When was the last time you saw the dentist?: 0-3 months ago   Flouride Varnish Application Screening  Is child seen by dentist?     Yes    Patient Active Problem List   Diagnosis     Vaccination Not Carried Out Due To Caregiver Refusal     Visual Impairment     Wears glasses       Drugs  Does the patient use tobacco/alcohol/drugs?:  Yes. He has not gotten drunk for a month. He has never blacked out.    Safety  Does the patient have any safety concerns (peer or home)?:  no  Does the patient use safety belts, helmets and other safety equipment?:  yes    Sex  Have you ever had sex?:  No    MEASUREMENTS  Height:  5' 11.25\" (1.81 m)  Weight: 159 lb 8 oz " (72.3 kg)  BMI: Body mass index is 22.09 kg/(m^2).  Blood Pressure: 100/60  Blood pressure percentiles are 1 % systolic and 8 % diastolic based on NHBPEP's 4th Report. Blood pressure percentile targets: 90: 137/91, 95: 141/95, 99 + 5 mmH/108.    PHYSICAL EXAM  Constitutional: He appears well-developed and well-nourished.   HEENT: Head: Normocephalic.    Right Ear: Tympanic membrane, external ear and canal normal.    Left Ear: Tympanic membrane, external ear and canal normal.    Nose: Nose normal.    Mouth/Throat: Mucous membranes are moist. Oropharynx is clear.    Eyes: Conjunctivae and lids are normal. Pupils are equal, round, and reactive to light.  Neck: Neck supple. No tenderness is present.   Cardiovascular: Normal rate and regular rhythm. No murmur heard.  Pulmonary/Chest: Effort normal and breath sounds normal. There is normal air entry.   Abdominal: Soft. There is no hepatosplenomegaly. No inguinal hernia.   Genitourinary: Testes normal and penis normal. Brian stage 5.   Musculoskeletal: Normal range of motion. Normal strength and tone. No abnormalities. Spine is straight. Normal duck walk. Normal heel-to-toe walk.   Neurological: He is alert. He has normal reflexes. Gait normal.   Psychiatric: He has a normal mood and affect. His speech is normal and behavior is normal.  Skin: Clear. No rashes.     ADDITIONAL HISTORY SUMMARIZED (2): None.  DECISION TO OBTAIN EXTRA INFORMATION (1): None.   RADIOLOGY TESTS (1): None.  LABS (1): None.  MEDICINE TESTS (1): None.  INDEPENDENT REVIEW (2 each): None.     The visit lasted a total of 34 minutes face to face with the patient. Over 50% of the time was spent counseling and educating the patient about nutrition and development.    I, Kaylee Frost, am scribing for and in the presence of, Dr. Freitas.    I, Dr. Lorena Freitas, personally performed the services described in this documentation, as scribed by Kaylee Frost in my presence, and it is both accurate and  complete.    Total Data Points: 0

## 2021-06-15 PROBLEM — Z97.3 WEARS GLASSES: Status: ACTIVE | Noted: 2017-03-03

## 2021-06-15 NOTE — PROGRESS NOTES
Jalen Hurtado is a 20 y.o. male who is being evaluated via a billable telephone visit.      What phone number would you like to be contacted at? [phone]  How would you like to obtain your AVS? AVS Preference: Mikeharsanya.    Assessment & Plan     Mild intermittent reactive airway disease without complication  ACT = 22 today.  Patient feels that his symptoms are well controlled.  Asthma action plan was created, copy was sent to patient via [employer].  He did not need additional albuterol inhaler at this time.  Inform patient that if he does note he is using his albuterol inhaler more frequently in the future, he should follow-up, may consider maintenance steroid inhaler at that time.    Anxiety:  Currently seeing psychiatry, who is managing his Lexapro and bupropion.         Tobacco Cessation:   reports that he has never smoked. His smokeless tobacco use includes snuff.    Return in about 3 months (around 5/15/2021) for Annual physical.    Mary Alberts MD  [facility]   Jalen Hurtado is 20 y.o. and presents today for the following health issues   HPI     We are following up today regarding his recent albuterol inhaler prescription.  Please see my previous note for more details.  Patient stated that he has had shortness of breath with cold air, so I did give him an albuterol inhaler.  He has only needed to use it when he is outdoors participating physical activity when it is cold outside.  He will use his albuterol about 3-4 times a week.  His breathing does improve with albuterol use.  He has not needed to use albuterol when he is indoors.  He still has plenty of medication left in his inhaler.  Currently denies any shortness of breath or difficulty breathing.            Review of Systems        Objective       Vitals:  No vitals were obtained today due to virtual visit.                Phone call duration: 5 minutes  10:02-10:07am

## 2021-06-21 NOTE — LETTER
Letter by Mary Alberts MD at      Author: Mary Alberts MD Service: -- Author Type: --    Filed:  Encounter Date: 2/15/2021 Status: (Other)       My Asthma Action Plan     Name: Jalen Hurtado   YOB: 2000  Date: 2/15/2021   My doctor: Mary Alberts MD   My clinic: [facility]        My Rescue Medicine:   Albuterol (Proair/Ventolin/Proventil HFA) 2-4 puffs EVERY 4 HOURS as needed. Use a spacer if recommended by your provider.   My Asthma Severity:   Intermittent/Exercise Induced  Know your asthma triggers: cold air             GREEN ZONE   Good Control    I feel good    No cough or wheeze    Can work, sleep and play without asthma symptoms     Take your asthma control medicine every day.     1. If exercise triggers your asthma, take your rescue medication    15 minutes before exercise or sports, and    During exercise if you have asthma symptoms  2. Spacer to use with inhaler: If you have a spacer, make sure to use it with your inhaler             YELLOW ZONE Getting Worse  I have ANY of these:    I do not feel good    Cough or wheeze    Chest feels tight    Wake up at night 1. Keep taking your Green Zone medications  2. Start taking your rescue medicine:    every 20 minutes for up to 1 hour. Then every 4 hours for 24-48 hours.  3. If you stay in the Yellow Zone for more than 12-24 hours, contact your doctor.  4. If you do not return to the Green Zone in 12-24 hours or you get worse, start taking your oral steroid medicine if prescribed by your provider.           RED ZONE Medical Alert - Get Help  I have ANY of these:    I feel awful    Medicine is not helping    Breathing getting harder    Trouble walking or talking    Nose opens wide to breathe     1. Take your rescue medicine NOW  2. If your provider has prescribed an oral steroid medicine, start taking it NOW  3. Call your doctor NOW  4. If you are still in the Red Zone after 20 minutes and you have not  reached your doctor:    Take your rescue medicine again and    Call 911 or go to the emergency room right away    See your regular doctor within 2 weeks of an Emergency Room or Urgent Care visit for follow-up treatment.          Annual Reminders:  Meet with Asthma Educator,  Flu Shot in the Fall, consider Pneumonia Vaccination for patients with asthma (aged 19 and older).    [facility]   [id number] [place], [place] - [address]  [address] [address]  Phone: [phone] Fax: [phone]      Electronically signed by Mary Alberts MD   Date: 02/15/21                      Asthma Triggers  How To Control Things That Make Your Asthma Worse    Triggers are things that make your asthma worse.  Look at the list below to help you find your triggers and what you can do about them.  You can help prevent asthma flare-ups by staying away from your triggers.      Trigger                                                          What you can do   Cigarette Smoke  Tobacco smoke can make asthma worse. Do not allow smoking in your home, car or around you.  Be sure no one smokes at a alicja day care or school.  If you smoke, ask your health care provider for ways to help you quit.  Ask family members to quit too.  Ask your health care provider for a referral to Quit Plan to help you quit smoking, or call 6-485-758-PLAN.     Colds, Flu, Bronchitis  These are common triggers of asthma. Wash your hands often.  Dont touch your eyes, nose or mouth.  Get a flu shot every year.     Dust Mites  These are tiny bugs that live in cloth or carpet. They are too small to see. Wash sheets and blankets in hot water every week.   Encase pillows and mattress in dust mite proof covers.  Avoid having carpet if you can. If you have carpet, vacuum weekly.   Use a dust mask and HEPA vacuum.   Pollen and Outdoor Mold  Some people are allergic to trees, grass, or weed pollen,  or molds. Try to keep your windows closed.  Limit time out doors when pollen count is high.   Ask you health care provider about taking medicine during allergy season.     Animal Dander  Some people are allergic to skin flakes, urine or saliva from pets with fur or feathers. Keep pets with fur or feathers out of your home.    If you cant keep the pet outdoors, then keep the pet out of your bedroom.  Keep the bedroom door closed.  Keep pets off cloth furniture and away from stuffed toys.     Mice, Rats, and Cockroaches  Some people are allergic to the waste from these pests.   Cover food and garbage.  Clean up spills and food crumbs.  Store grease in the refrigerator.   Keep food out of the bedroom.   Indoor Mold  This can be a trigger if your home has high moisture. Fix leaking faucets, pipes, or other sources of water.   Clean moldy surfaces.  Dehumidify basement if it is damp and smelly.   Smoke, Strong Odors, and Sprays  These can reduce air quality. Stay away from strong odors and sprays, such as perfume, powder, hair spray, paints, smoke incense, paint, cleaning products, candles and new carpet.   Exercise or Sports  Some people with asthma have this trigger. Be active!  Ask your doctor about taking medicine before sports or exercise to prevent symptoms.    Warm up for 5-10 minutes before and after sports or exercise.     Other Triggers of Asthma  Cold air:  Cover your nose and mouth with a scarf.  Sometimes laughing or crying can be a trigger.  Some medicines and food can trigger asthma.

## 2021-06-26 NOTE — PROGRESS NOTES
Progress Notes by Gunjan Mcallister MD at 11/19/2018  1:50 PM     Author: Gunjan Mcallister MD Service: -- Author Type: Physician    Filed: 11/19/2018  2:37 PM Encounter Date: 11/19/2018 Status: Signed    : Gunjan Mcallister MD (Physician)           Click to link to [facility] NOTE    Thank you, Dr. Alvarado, for asking me to see Jalen Hurtado in consultation at [facility] to evaluate palpitations.      Assessment/Plan:   1.  Palpitations: The patient developed palpitations associated with exertion and lightheadedness.  He has been doing hockey sport.  His ECG did not show any signs of hypertrophic cardiomyopathy, ARVD and other conduction abnormality.  Holter monitor is requested for evaluation of palpitations.  Echocardiogram is also requested to rule out structural heart disease.      Thank you for the opportunity to be involved in the care of Jalen Hurtado. If you have any questions, please feel free to contact me.  I will see the patient again as needed.    Much or all of the text in this note was generated through the use of Dragon Dictate voice-to-text software. Errors in spelling or words which seem out of context are unintentional.   Sound alike errors, in particular, may have escaped editing.       History of Present Illness:   It is my pleasure to see Jalen Hurtado at the [facility] for evaluation of Consult. Jalen Hurtado is a 18 y.o. male without significant medical history.    The patient has been doing hockey sport.  He developed palpitations when he was doing exercise.  He has lightheadedness which is associated with palpitations.  He never had syncopal episode.  He had no chest pain, shortness of breath, orthopnea, PND or leg edema.  He is a pressure and heart rate are in normal range.    ECG in clinic showed normal sinus rhythm, normal ECG.    Past Medical History:     Patient Active Problem List   Diagnosis   ? Vaccination Not Carried  "Out Due To Caregiver Refusal   ? Visual Impairment   ? Wears glasses       Past Surgical History:   History reviewed. No pertinent surgical history.    Family History:     Family History   Problem Relation Age of Onset   ? No Medical Problems Mother    ? No Medical Problems Father    ? Neuropathy Maternal Grandfather    ? Other Paternal Grandmother         Colon issues   ? Lung cancer Paternal Grandfather         was a smoker       Social History:    reports that  has never smoked. His smokeless tobacco use includes snuff. He reports that he does not drink alcohol or use drugs.    Review of Systems:   General: WNL  Eyes: WNL  Ears/Nose/Throat: Nosebleeds  Lungs: WNL  Heart: WNL  Stomach: WNL  Bladder: WNL  Muscle/Joints: WNL  Skin: WNL  Nervous System: WNL  Mental Health: WNL     Blood: WNL    Meds:     Current Outpatient Medications:   ?  LORazepam (ATIVAN) 1 MG tablet, Take 1/2 to 1 tablet by mouth one hour before air travel as needed for anxiety., Disp: 5 tablet, Rfl: 0     Allergies:   Patient has no known allergies.    Objective:      Physical Exam  163 lb (73.9 kg)  5' 11.25\" (1.81 m)  Body mass index is 22.57 kg/m .  /68 (Patient Site: Right Arm, Patient Position: Sitting, Cuff Size: Adult Regular)   Pulse 80   Resp 14   Ht 5' 11.25\" (1.81 m)   Wt 163 lb (73.9 kg)   BMI 22.57 kg/m      General Appearance:   Awake, Alert, No acute distress.   HEENT:  Pupil equal, reactive to light. No scleral icterus; the mucous membranes were moist. No oral ulcers or thrush.    Neck: No cervical bruits. No JVD. No thyromegaly. No lymph node enlargement or tenderness.   Chest: The spine was straight. The chest was symmetric.   Lungs:   Respirations unlabored. Lungs are clear to auscultation. No crackles. No wheezing.   Cardiovascular:   RRR, normal first and second heart sounds with no murmurs. No rubs or gallops.    Abdomen:  Soft. No tenderness. Non-distended. Bowels sounds are present   Extremities: Equal " posterior tibial pulses. No leg edema.   Skin: No rashes or ulcers. Warm, Dry.   Musculoskeletal: No tenderness. No deformity.   Neurologic: Mood and affect are appropriate. No focal deficits.         EKG:  Personally reivewed  Normal sinus rhythm  Normal ECG  No previous ECG for comparison.    Lab Review   No results found for: NA, K, CL, CO2, BUN, CREATININE, GLUCOSE, CALCIUM  No results found for: WBC, HGB, HCT, MCV, PLT  Lab Results   Component Value Date    CHOL 121 12/21/2017    TRIG 32 12/21/2017    HDL 52 12/21/2017

## 2021-06-27 ENCOUNTER — HEALTH MAINTENANCE LETTER (OUTPATIENT)
Age: 21
End: 2021-06-27

## 2021-06-28 ENCOUNTER — OFFICE VISIT - HEALTHEAST (OUTPATIENT)
Dept: FAMILY MEDICINE | Facility: CLINIC | Age: 21
End: 2021-06-28

## 2021-06-28 DIAGNOSIS — F41.9 ANXIETY: ICD-10-CM

## 2021-06-28 DIAGNOSIS — Z00.00 ROUTINE GENERAL MEDICAL EXAMINATION AT A HEALTH CARE FACILITY: ICD-10-CM

## 2021-06-28 LAB — HIV 1+2 AB+HIV1 P24 AG SERPL QL IA: NEGATIVE

## 2021-06-28 ASSESSMENT — ANXIETY QUESTIONNAIRES
GAD7 TOTAL SCORE: 0
5. BEING SO RESTLESS THAT IT IS HARD TO SIT STILL: NOT AT ALL
3. WORRYING TOO MUCH ABOUT DIFFERENT THINGS: NOT AT ALL
4. TROUBLE RELAXING: NOT AT ALL
7. FEELING AFRAID AS IF SOMETHING AWFUL MIGHT HAPPEN: NOT AT ALL
1. FEELING NERVOUS, ANXIOUS, OR ON EDGE: NOT AT ALL
2. NOT BEING ABLE TO STOP OR CONTROL WORRYING: NOT AT ALL
IF YOU CHECKED OFF ANY PROBLEMS ON THIS QUESTIONNAIRE, HOW DIFFICULT HAVE THESE PROBLEMS MADE IT FOR YOU TO DO YOUR WORK, TAKE CARE OF THINGS AT HOME, OR GET ALONG WITH OTHER PEOPLE: NOT DIFFICULT AT ALL
6. BECOMING EASILY ANNOYED OR IRRITABLE: NOT AT ALL

## 2021-06-28 ASSESSMENT — PATIENT HEALTH QUESTIONNAIRE - PHQ9: SUM OF ALL RESPONSES TO PHQ QUESTIONS 1-9: 0

## 2021-06-28 ASSESSMENT — MIFFLIN-ST. JEOR: SCORE: 1920.36

## 2021-06-29 LAB — HCV AB SERPL QL IA: NEGATIVE

## 2021-07-05 PROBLEM — F41.9 ANXIETY: Status: ACTIVE | Noted: 2021-06-28

## 2021-07-06 VITALS
HEIGHT: 72 IN | HEART RATE: 66 BPM | SYSTOLIC BLOOD PRESSURE: 106 MMHG | DIASTOLIC BLOOD PRESSURE: 64 MMHG | WEIGHT: 195.4 LBS | OXYGEN SATURATION: 97 % | BODY MASS INDEX: 26.47 KG/M2

## 2021-07-06 ASSESSMENT — PATIENT HEALTH QUESTIONNAIRE - PHQ9: SUM OF ALL RESPONSES TO PHQ QUESTIONS 1-9: 0

## 2021-07-07 NOTE — PROGRESS NOTES
Progress Notes by Mary Alberts MD at 6/28/2021  8:20 AM     Author: Mary Alberts MD Service: -- Author Type: Physician    Filed: 6/28/2021  8:56 AM Encounter Date: 6/28/2021 Status: Signed    : Mary Alberts MD (Physician)       MALE PREVENTATIVE EXAM    Assessment and Plan:     Patient has been advised of split billing requirements and indicates understanding: No    1. Routine general medical examination at a health care facility  Discussed one-time HIV and hepatitis C screening, ordered today.  Also reviewed weight and BMI with patient.  Discussed healthy lifestyle.  - Hepatitis C Antibody (Anti-HCV)  - HIV Antigen/Antibody Screening [facility]    2. Anxiety  Doing well, will continue care with a psychiatrist.    Next follow up:  Return in about 1 year (around 6/28/2022) for Annual physical.    Immunization Review  Adult Imm Review: Due today, orders placed      I discussed the following with the patient:   Adult Healthy Living: Importance of regular exercise  Healthy nutrition        Subjective:   Chief Complaint: Jalen Hurtado is an 20 y.o. male here for a preventative health visit.    Patient has been advised of split billing requirements and indicates understanding: No  HPI:  No additional concerns.  Sees Dr. Lezama (sp?)  Psychiatry, who is currently managing his medications for anxiety.  Currently taking bupropion and Lexapro.  Doing well on current medications.    Healthy Habits  Are you taking a daily aspirin? No  Do you typically exercising at least 40 min, 3-4 times per week?  Yes  Do you usually eat at least 4 servings of fruit and vegetables a day, include whole grains and fiber and avoid regularly eating high fat foods? Yes  Have you had an eye exam in the past two years? NO  Do you see a dentist twice per year? Yes  Do you have any concerns regarding sleep? No    Safety Screen  If you own firearms, are they secured in a locked gun cabinet or with trigger locks? The patient does not  "own any firearms  Do you feel you are safe where you are living?: Yes (6/28/2021  8:21 AM)  Do you feel you are safe in your relationship(s)?: Yes (6/28/2021  8:21 AM)      Review of Systems:  Please see above.  The rest of the review of systems are negative for all systems.     Cancer Screening     Patient has no health maintenance due at this time          Patient Care Team:  Mary Alberts MD as PCP - General (Family Medicine)  Mary Alberts MD as Assigned PCP        History     Reviewed By Date/Time Sections Reviewed    Mary Alberts MD 6/28/2021  8:32 AM Tobacco, Alcohol, Drug Use, Social Documentation    Mary Alberts MD 6/28/2021  8:31 AM Family    Mary Alberts MD 6/28/2021  8:30 AM Medical, Surgical    Svetlana Benavidez CMA 6/28/2021  8:21 AM Tobacco            Objective:   Vital Signs:   Visit Vitals  /64 (Patient Site: Left Arm, Patient Position: Sitting, Cuff Size: Adult Large)   Pulse 66   Ht 5' 11.5\" (1.816 m)   Wt 195 lb 6.4 oz (88.6 kg)   SpO2 97%   BMI 26.87 kg/m           PHYSICAL EXAM  General Appearance: Alert, cooperative, no distress, appears stated age  Head: Normocephalic, without obvious abnormality, atraumatic  Eyes: PERRL, conjunctiva/corneas clear, EOM's intact  Ears: Normal TM's and external ear canals, both ears  Neck: Supple, symmetrical, trachea midline, no adenopathy;  thyroid: not enlarged, symmetric, no tenderness/mass/nodules; no carotid bruit or JVD  Lungs: Clear to auscultation bilaterally, respirations unlabored  Heart: Regular rate and rhythm, S1 and S2 normal, no murmur.  Abdomen: Soft, non-tender, bowel sounds active all four quadrants,  no masses, no organomegaly  Genitourinary: Penis normal. No hernias palpated  Musculoskeletal: Normal range of motion. No joint swelling or deformity.   Extremities: Extremities normal, atraumatic, no cyanosis or edema  Skin: Skin color, texture, turgor normal, no rashes or lesions  Lymph nodes: Cervical, supraclavicular, " and axillary nodes normal  Neurologic: Alert.  Normal speech.  No focal deficits.  Normal deep tendon reflexes.   Psychiatric: Normal mood and affect.  Does not appear anxious or depressed.  See flowsheet for PHQ-9 and KAREN-7               Medication List          Accurate as of June 28, 2021  8:44 AM. If you have any questions, ask your nurse or doctor.            CONTINUE taking these medications    albuterol 90 mcg/actuation inhaler  Also known as: PROAIR HFA;PROVENTIL HFA;VENTOLIN HFA  INSTRUCTIONS: Inhale 2 puffs every 6 (six) hours as needed for wheezing.  Doctor's comments: May substitute the equivalent medication per insurance preference.        buPROPion 150 MG 24 hr tablet  Also known as: WELLBUTRIN XL        escitalopram oxalate 10 MG tablet  Also known as: LEXAPRO               Additional Screenings Completed Today:

## 2021-07-08 ASSESSMENT — ANXIETY QUESTIONNAIRES: GAD7 TOTAL SCORE: 0

## 2021-10-17 ENCOUNTER — HEALTH MAINTENANCE LETTER (OUTPATIENT)
Age: 21
End: 2021-10-17

## 2021-12-11 ENCOUNTER — HEALTH MAINTENANCE LETTER (OUTPATIENT)
Age: 21
End: 2021-12-11

## 2022-10-01 ENCOUNTER — HEALTH MAINTENANCE LETTER (OUTPATIENT)
Age: 22
End: 2022-10-01

## 2022-12-17 ASSESSMENT — ENCOUNTER SYMPTOMS
HEMATURIA: 0
HEMATOCHEZIA: 0
ARTHRALGIAS: 0
DIARRHEA: 0
JOINT SWELLING: 0
HEADACHES: 0
WEAKNESS: 0
NERVOUS/ANXIOUS: 0
SHORTNESS OF BREATH: 0
PARESTHESIAS: 0
FEVER: 0
DYSURIA: 0
HEARTBURN: 0
ABDOMINAL PAIN: 0
PALPITATIONS: 0
SORE THROAT: 0
MYALGIAS: 0
FREQUENCY: 0
CHILLS: 0
CONSTIPATION: 0
COUGH: 0
EYE PAIN: 0
NAUSEA: 0
DIZZINESS: 0

## 2022-12-17 ASSESSMENT — ASTHMA QUESTIONNAIRES
QUESTION_1 LAST FOUR WEEKS HOW MUCH OF THE TIME DID YOUR ASTHMA KEEP YOU FROM GETTING AS MUCH DONE AT WORK, SCHOOL OR AT HOME: NONE OF THE TIME
QUESTION_3 LAST FOUR WEEKS HOW OFTEN DID YOUR ASTHMA SYMPTOMS (WHEEZING, COUGHING, SHORTNESS OF BREATH, CHEST TIGHTNESS OR PAIN) WAKE YOU UP AT NIGHT OR EARLIER THAN USUAL IN THE MORNING: NOT AT ALL
QUESTION_2 LAST FOUR WEEKS HOW OFTEN HAVE YOU HAD SHORTNESS OF BREATH: NOT AT ALL
ACT_TOTALSCORE: 24
ACT_TOTALSCORE: 24
QUESTION_4 LAST FOUR WEEKS HOW OFTEN HAVE YOU USED YOUR RESCUE INHALER OR NEBULIZER MEDICATION (SUCH AS ALBUTEROL): ONCE A WEEK OR LESS
QUESTION_5 LAST FOUR WEEKS HOW WOULD YOU RATE YOUR ASTHMA CONTROL: COMPLETELY CONTROLLED

## 2022-12-20 ENCOUNTER — OFFICE VISIT (OUTPATIENT)
Dept: FAMILY MEDICINE | Facility: CLINIC | Age: 22
End: 2022-12-20
Payer: COMMERCIAL

## 2022-12-20 VITALS
HEIGHT: 72 IN | WEIGHT: 202.6 LBS | RESPIRATION RATE: 16 BRPM | SYSTOLIC BLOOD PRESSURE: 112 MMHG | TEMPERATURE: 98.3 F | DIASTOLIC BLOOD PRESSURE: 70 MMHG | HEART RATE: 66 BPM | BODY MASS INDEX: 27.44 KG/M2 | OXYGEN SATURATION: 97 %

## 2022-12-20 DIAGNOSIS — Z00.00 ROUTINE GENERAL MEDICAL EXAMINATION AT A HEALTH CARE FACILITY: Primary | ICD-10-CM

## 2022-12-20 PROCEDURE — 90686 IIV4 VACC NO PRSV 0.5 ML IM: CPT | Performed by: FAMILY MEDICINE

## 2022-12-20 PROCEDURE — 90651 9VHPV VACCINE 2/3 DOSE IM: CPT | Performed by: FAMILY MEDICINE

## 2022-12-20 PROCEDURE — 99395 PREV VISIT EST AGE 18-39: CPT | Mod: 25 | Performed by: FAMILY MEDICINE

## 2022-12-20 PROCEDURE — 90472 IMMUNIZATION ADMIN EACH ADD: CPT | Performed by: FAMILY MEDICINE

## 2022-12-20 PROCEDURE — 90471 IMMUNIZATION ADMIN: CPT | Performed by: FAMILY MEDICINE

## 2022-12-20 ASSESSMENT — ENCOUNTER SYMPTOMS
FREQUENCY: 0
NERVOUS/ANXIOUS: 0
CONSTIPATION: 0
SHORTNESS OF BREATH: 0
EYE PAIN: 0
DIZZINESS: 0
HEADACHES: 0
HEMATOCHEZIA: 0
MYALGIAS: 0
NAUSEA: 0
ARTHRALGIAS: 0
JOINT SWELLING: 0
CHILLS: 0
PALPITATIONS: 0
FEVER: 0
WEAKNESS: 0
DIARRHEA: 0
HEARTBURN: 0
DYSURIA: 0
ABDOMINAL PAIN: 0
COUGH: 0
SORE THROAT: 0
HEMATURIA: 0
PARESTHESIAS: 0

## 2022-12-20 ASSESSMENT — PAIN SCALES - GENERAL: PAINLEVEL: NO PAIN (0)

## 2023-11-20 ENCOUNTER — PATIENT OUTREACH (OUTPATIENT)
Dept: CARE COORDINATION | Facility: CLINIC | Age: 23
End: 2023-11-20
Payer: COMMERCIAL

## 2023-12-04 ENCOUNTER — PATIENT OUTREACH (OUTPATIENT)
Dept: CARE COORDINATION | Facility: CLINIC | Age: 23
End: 2023-12-04
Payer: COMMERCIAL

## 2024-06-17 ENCOUNTER — PATIENT OUTREACH (OUTPATIENT)
Dept: CARE COORDINATION | Facility: CLINIC | Age: 24
End: 2024-06-17
Payer: COMMERCIAL

## 2025-05-18 ENCOUNTER — HEALTH MAINTENANCE LETTER (OUTPATIENT)
Age: 25
End: 2025-05-18